# Patient Record
Sex: MALE
[De-identification: names, ages, dates, MRNs, and addresses within clinical notes are randomized per-mention and may not be internally consistent; named-entity substitution may affect disease eponyms.]

---

## 2024-11-18 ENCOUNTER — DASHBOARD ENCOUNTERS (OUTPATIENT)
Age: 54
End: 2024-11-18

## 2024-12-10 ENCOUNTER — LAB OUTSIDE AN ENCOUNTER (OUTPATIENT)
Dept: URBAN - METROPOLITAN AREA CLINIC 105 | Facility: CLINIC | Age: 54
End: 2024-12-10

## 2024-12-10 ENCOUNTER — OFFICE VISIT (OUTPATIENT)
Dept: URBAN - METROPOLITAN AREA CLINIC 105 | Facility: CLINIC | Age: 54
End: 2024-12-10
Payer: COMMERCIAL

## 2024-12-10 VITALS
HEART RATE: 70 BPM | SYSTOLIC BLOOD PRESSURE: 120 MMHG | BODY MASS INDEX: 21.67 KG/M2 | TEMPERATURE: 97.3 F | DIASTOLIC BLOOD PRESSURE: 79 MMHG | WEIGHT: 154.8 LBS | HEIGHT: 71 IN

## 2024-12-10 DIAGNOSIS — K21.9 GASTROESOPHAGEAL REFLUX DISEASE, UNSPECIFIED WHETHER ESOPHAGITIS PRESENT: ICD-10-CM

## 2024-12-10 DIAGNOSIS — Z12.11 COLON CANCER SCREENING: ICD-10-CM

## 2024-12-10 PROBLEM — 235595009: Status: ACTIVE | Noted: 2024-12-10

## 2024-12-10 PROCEDURE — 99204 OFFICE O/P NEW MOD 45 MIN: CPT | Performed by: INTERNAL MEDICINE

## 2024-12-10 RX ORDER — LISINOPRIL 10 MG/1
1 TABLET TABLET ORAL ONCE A DAY
Status: ACTIVE | COMMUNITY

## 2024-12-10 NOTE — HPI-TODAY'S VISIT:
The patient presents for reflux and epigastric pain.  Today, the patient reports onset of reflux in his mid 20s - upper chest burning. At the time, he treated reflux w/ Prevacid PRN. Reflux worsened as he got older. He now has epigastric pain that can wax and wane. Trigger for pain are the same triggers for his reflux. He can have pain if he eats or drinks too much. Meredith Fan usually resolved the pain in an hour. He recently started taking Pepcid 1-2 pills/day. He's been on Pepcid 4-5 months - epigastric discomfort 75% better. In the first two months of use, his pain was 50% better. He had an esophagram 10 years ago which was negative.  He drinks coffee 1-2 cups/day, carbonated water 4/week, 2 alcohol drinks/week. Dinner at 7:30 pm, bed at 11 pm without snacking in between.  Last colon was 2 years ago at [place] [facility] [place] and 10-year follow-up given.  PSH: wisdom teeth extraction, tooth replacement

## 2024-12-13 ENCOUNTER — OFFICE VISIT (OUTPATIENT)
Dept: URBAN - METROPOLITAN AREA CLINIC 91 | Facility: CLINIC | Age: 54
End: 2024-12-13

## 2024-12-19 ENCOUNTER — OFFICE VISIT (OUTPATIENT)
Dept: URBAN - METROPOLITAN AREA SURGERY CENTER 16 | Facility: SURGERY CENTER | Age: 54
End: 2024-12-19

## 2024-12-19 ENCOUNTER — CLAIMS CREATED FROM THE CLAIM WINDOW (OUTPATIENT)
Dept: URBAN - METROPOLITAN AREA SURGERY CENTER 16 | Facility: SURGERY CENTER | Age: 54
End: 2024-12-19
Payer: COMMERCIAL

## 2024-12-19 DIAGNOSIS — K21.9 GASTRIC REFLUX: ICD-10-CM

## 2024-12-19 PROCEDURE — 00731 ANES UPR GI NDSC PX NOS: CPT | Performed by: ANESTHESIOLOGY

## 2024-12-19 RX ORDER — LISINOPRIL 10 MG/1
1 TABLET TABLET ORAL ONCE A DAY
Status: ACTIVE | COMMUNITY

## 2025-01-17 ENCOUNTER — OFFICE VISIT (OUTPATIENT)
Dept: URBAN - METROPOLITAN AREA CLINIC 91 | Facility: CLINIC | Age: 55
End: 2025-01-17
Payer: COMMERCIAL

## 2025-01-17 DIAGNOSIS — R10.13 EPIGASTRIC PAIN: ICD-10-CM

## 2025-01-17 PROCEDURE — 76705 ECHO EXAM OF ABDOMEN: CPT | Performed by: INTERNAL MEDICINE

## 2025-01-17 RX ORDER — LISINOPRIL 10 MG/1
1 TABLET TABLET ORAL ONCE A DAY
COMMUNITY

## 2025-01-28 ENCOUNTER — OFFICE VISIT (OUTPATIENT)
Dept: URBAN - METROPOLITAN AREA CLINIC 105 | Facility: CLINIC | Age: 55
End: 2025-01-28
Payer: COMMERCIAL

## 2025-01-28 VITALS
BODY MASS INDEX: 21.14 KG/M2 | WEIGHT: 151 LBS | HEIGHT: 71 IN | DIASTOLIC BLOOD PRESSURE: 85 MMHG | TEMPERATURE: 97 F | HEART RATE: 65 BPM | SYSTOLIC BLOOD PRESSURE: 123 MMHG

## 2025-01-28 DIAGNOSIS — K21.9 GASTROESOPHAGEAL REFLUX DISEASE, UNSPECIFIED WHETHER ESOPHAGITIS PRESENT: ICD-10-CM

## 2025-01-28 DIAGNOSIS — Z12.11 COLON CANCER SCREENING: ICD-10-CM

## 2025-01-28 PROCEDURE — 99214 OFFICE O/P EST MOD 30 MIN: CPT | Performed by: INTERNAL MEDICINE

## 2025-01-28 RX ORDER — LISINOPRIL 10 MG/1
1 TABLET TABLET ORAL ONCE A DAY
Status: ACTIVE | COMMUNITY

## 2025-01-28 RX ORDER — OMEPRAZOLE 20 MG/1
1 CAPSULE 1/2 TO 1 HOUR BEFORE MORNING MEAL CAPSULE, DELAYED RELEASE ORAL ONCE A DAY
Qty: 30 | Refills: 2 | OUTPATIENT
Start: 2025-01-28

## 2025-01-28 NOTE — HPI-TODAY'S VISIT:
The patient presents in follow-up for reflux and epigastric pain.  On 12/10/24, the patient reported onset of reflux in his mid 20s - upper chest burning. At the time, he treated reflux w/ Prevacid PRN. Reflux worsened as he got older. He now had epigastric pain that could wax and wane. Trigger for pain were the same triggers for his reflux. He could have pain if he ate or drank too much. Meredith Selzer usually resolved the pain in an hour. He recently started taking Pepcid 1-2 pills/day. He had been on Pepcid 4-5 months - epigastric discomfort 75% better. In the first two months of use, his pain was 50% better. He had an esophagram 10 years ago which was negative.  He drank coffee 1-2 cups/day, carbonated water 4/week, 2 alcohol drinks/week. Dinner at 7:30 pm, bed at 11 pm without snacking in between.  Last colon was 2 years ago at [place] - normal and 10-year follow-up given.  PSH: wisdom teeth extraction, tooth replacement  HPI Today, he says he continues on famotidine 20 mg 1-2x/day. He's only had one late night episode of epigastric burning that he associates with diet and resolved with meredith seltzer. He's limited soda intake.

## 2025-04-20 ENCOUNTER — ERX REFILL RESPONSE (OUTPATIENT)
Dept: URBAN - METROPOLITAN AREA CLINIC 105 | Facility: CLINIC | Age: 55
End: 2025-04-20

## 2025-04-20 RX ORDER — OMEPRAZOLE 20 MG/1
TAKE 1 CAPSULE 1/2 TO 1 HOUR BY MOUTH BEFORE MORNING MEAL ONCE A DAY X 30 DAYS CAPSULE, DELAYED RELEASE ORAL
Qty: 30 CAPSULE | Refills: 2

## 2025-05-07 ENCOUNTER — OFFICE VISIT (OUTPATIENT)
Dept: URBAN - METROPOLITAN AREA CLINIC 105 | Facility: CLINIC | Age: 55
End: 2025-05-07
Payer: COMMERCIAL

## 2025-05-07 ENCOUNTER — ERX REFILL RESPONSE (OUTPATIENT)
Dept: URBAN - METROPOLITAN AREA CLINIC 105 | Facility: CLINIC | Age: 55
End: 2025-05-07

## 2025-05-07 DIAGNOSIS — Z12.11 COLON CANCER SCREENING: ICD-10-CM

## 2025-05-07 DIAGNOSIS — K21.9 GASTROESOPHAGEAL REFLUX DISEASE, UNSPECIFIED WHETHER ESOPHAGITIS PRESENT: ICD-10-CM

## 2025-05-07 PROCEDURE — 99214 OFFICE O/P EST MOD 30 MIN: CPT | Performed by: INTERNAL MEDICINE

## 2025-05-07 RX ORDER — OMEPRAZOLE 20 MG/1
TAKE 1 CAPSULE 1/2 TO 1 HOUR BY MOUTH BEFORE MORNING MEAL ONCE A DAY X 30 DAYS CAPSULE, DELAYED RELEASE ORAL
Qty: 30 CAPSULE | Refills: 2 | Status: ACTIVE | COMMUNITY

## 2025-05-07 RX ORDER — OMEPRAZOLE 20 MG/1
1 CAPSULE 1/2 TO 1 HOUR BEFORE MORNING MEAL CAPSULE, DELAYED RELEASE ORAL TWICE A DAY
Qty: 180 | Refills: 1 | OUTPATIENT

## 2025-05-07 RX ORDER — OMEPRAZOLE 20 MG/1
1 CAPSULE CAPSULE, DELAYED RELEASE ORAL
Qty: 180 | Refills: 3 | OUTPATIENT

## 2025-05-07 RX ORDER — LISINOPRIL 10 MG/1
1 TABLET TABLET ORAL ONCE A DAY
Status: ACTIVE | COMMUNITY

## 2025-05-07 NOTE — HPI-TODAY'S VISIT:
The patient presents in follow-up for reflux and epigastric pain.  On 12/10/24, the patient reported onset of reflux in his mid 20s - upper chest burning. At the time, he treated reflux w/ Prevacid PRN. Reflux worsened as he got older. He now had epigastric pain that could wax and wane. Trigger for pain were the same triggers for his reflux. He could have pain if he ate or drank too much. Meredith Selzer usually resolved the pain in an hour. He recently started taking Pepcid 1-2 pills/day. He had been on Pepcid 4-5 months - epigastric discomfort 75% better. In the first two months of use, his pain was 50% better. He had an esophagram 10 years ago which was negative.  He drank coffee 1-2 cups/day, carbonated water 4/week, 2 alcohol drinks/week. Dinner at 7:30 pm, bed at 11 pm without snacking in between.  Last colon was 2 years ago at [place] - normal and 10-year follow-up given.  PSH: wisdom teeth extraction, tooth replacement  On 1/28/25, he said he continued on famotidine 20 mg 1-2x/day. He only had one late night episode of epigastric burning that he associated with diet and resolved with meredith seltzer. He had limited soda intake.  HPI Today, he says he noted a significant improvement in symptoms for the 1st 2-3 months that he was on omeprazole 20 mg QD. He notes that overeating can trigger an epigastric "gnawing"/burning sensation. He takes Meredith [place] PRN which provides relief.